# Patient Record
Sex: FEMALE | URBAN - METROPOLITAN AREA
[De-identification: names, ages, dates, MRNs, and addresses within clinical notes are randomized per-mention and may not be internally consistent; named-entity substitution may affect disease eponyms.]

---

## 2018-01-23 ENCOUNTER — IMPORTED ENCOUNTER (OUTPATIENT)
Dept: URBAN - METROPOLITAN AREA CLINIC 9 | Facility: CLINIC | Age: 73
End: 2018-01-23

## 2019-01-11 ENCOUNTER — IMPORTED ENCOUNTER (OUTPATIENT)
Dept: URBAN - METROPOLITAN AREA CLINIC 9 | Facility: CLINIC | Age: 74
End: 2019-01-11

## 2019-01-18 ENCOUNTER — IMPORTED ENCOUNTER (OUTPATIENT)
Dept: URBAN - METROPOLITAN AREA CLINIC 9 | Facility: CLINIC | Age: 74
End: 2019-01-18

## 2019-02-04 ENCOUNTER — IMPORTED ENCOUNTER (OUTPATIENT)
Dept: URBAN - METROPOLITAN AREA CLINIC 9 | Facility: CLINIC | Age: 74
End: 2019-02-04

## 2020-03-06 ENCOUNTER — IMPORTED ENCOUNTER (OUTPATIENT)
Dept: URBAN - METROPOLITAN AREA CLINIC 9 | Facility: CLINIC | Age: 75
End: 2020-03-06

## 2020-05-22 ENCOUNTER — IMPORTED ENCOUNTER (OUTPATIENT)
Dept: URBAN - METROPOLITAN AREA CLINIC 9 | Facility: CLINIC | Age: 75
End: 2020-05-22

## 2020-06-02 NOTE — PATIENT DISCUSSION
Pt does a LOT of work on her phone and wants the best close up vision.  Recommend Mian LARES OU, pt does very little intermediate work.  Most important to pt is distance and reading.
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
## 2020-06-29 NOTE — PATIENT DISCUSSION
Cataract surgery has been performed in the first eye and activities of daily living are still impaired. The patient would like to proceed with cataract surgery in the second eye as scheduled. The patient elects TMF +325 OS, goal of Lilly.

## 2020-07-14 NOTE — PATIENT DISCUSSION
Pt is likely having slight discomfort OS due to ANN; pt will use warm compress and AT QID .  Consider Ilevro QD OS if needed if pt needs it.

## 2021-10-18 ASSESSMENT — KERATOMETRY
OS_AXISANGLE_DEGREES: 66
OS_K2POWER_DIOPTERS: 47.25
OD_K1POWER_DIOPTERS: 47
OD_K2POWER_DIOPTERS: 48
OS_K1POWER_DIOPTERS: 46.5
OD_AXISANGLE2_DEGREES: 19
OS_AXISANGLE2_DEGREES: 156
OD_AXISANGLE_DEGREES: 109

## 2021-10-18 ASSESSMENT — TONOMETRY
OS_IOP_MMHG: 14
OS_IOP_MMHG: 17
OD_IOP_MMHG: 14
OS_IOP_MMHG: 12
OD_IOP_MMHG: 17
OS_IOP_MMHG: 14
OD_IOP_MMHG: 15
OD_IOP_MMHG: 14
OD_IOP_MMHG: 18
OD_IOP_MMHG: 14
OS_IOP_MMHG: 15
OS_IOP_MMHG: 13

## 2021-10-18 ASSESSMENT — VISUAL ACUITY
OD_SC: 20/25 SN
OD_SC: 20/25 SN
OD_CC: 20/20 -2 SN
OS_CC: 20/20 - SN
OS_SC: 20/40 SN
OD_SC: 20/25 SN
OS_SC: 20/30 SN
OS_SC: 20/30 - SN
OD_SC: 20/25 -2 SN
OD_CC: 20/30 SN
OS_SC: 20/25 SN
OS_SC: 20/30 SN
OS_CC: 20/25 SN
OD_CC: 20/25 SN
OS_CC: 20/25 SN
OD_SC: 20/30 SN
OD_SC: 20/30 - SN
OS_SC: 20/25 - SN

## 2022-06-20 RX ORDER — ESTRADIOL 0.5 MG/1
TABLET ORAL
COMMUNITY

## 2022-06-20 RX ORDER — OLMESARTAN MEDOXOMIL 5 MG/1
TABLET ORAL
COMMUNITY

## 2022-06-20 RX ORDER — ALBUTEROL SULFATE 90 UG/1
AEROSOL, METERED RESPIRATORY (INHALATION)
COMMUNITY

## 2022-06-20 RX ORDER — ALENDRONATE SODIUM 70 MG/1
TABLET ORAL
COMMUNITY
Start: 2021-11-17 | End: 2022-07-21

## 2022-06-20 RX ORDER — MINOXIDIL 2 %
SOLUTION, NON-ORAL TOPICAL
COMMUNITY

## 2022-06-20 RX ORDER — BUTALBITAL/ACETAMINOPHEN 50MG-325MG
TABLET ORAL
COMMUNITY
End: 2022-08-19

## 2022-06-20 RX ORDER — MEDROXYPROGESTERONE ACETATE 10 MG/1
TABLET ORAL
COMMUNITY

## 2022-06-20 RX ORDER — RISEDRONATE SODIUM 150 MG/1
TABLET, FILM COATED ORAL
COMMUNITY
Start: 2021-11-17 | End: 2022-07-21

## 2022-06-20 RX ORDER — FINASTERIDE 1 MG/1
TABLET, FILM COATED ORAL
COMMUNITY

## 2022-06-20 RX ORDER — ATORVASTATIN CALCIUM 10 MG/1
TABLET, FILM COATED ORAL
COMMUNITY

## 2022-06-20 RX ORDER — FLUTICASONE FUROATE, UMECLIDINIUM BROMIDE AND VILANTEROL TRIFENATATE 100; 62.5; 25 UG/1; UG/1; UG/1
1 POWDER RESPIRATORY (INHALATION)
COMMUNITY

## 2022-07-21 PROBLEM — K59.01 SLOW TRANSIT CONSTIPATION: Status: ACTIVE | Noted: 2022-07-21

## 2022-07-21 PROBLEM — H91.90 HEARING LOSS: Status: ACTIVE | Noted: 2022-07-21

## 2022-07-21 PROBLEM — E78.5 HYPERLIPIDEMIA: Status: ACTIVE | Noted: 2022-07-21

## 2022-07-21 PROBLEM — M81.0 AGE-RELATED OSTEOPOROSIS WITHOUT CURRENT PATHOLOGICAL FRACTURE: Status: ACTIVE | Noted: 2022-07-21

## 2022-07-21 PROBLEM — J44.9 COPD (CHRONIC OBSTRUCTIVE PULMONARY DISEASE) WITH CHRONIC BRONCHITIS (HCC): Status: ACTIVE | Noted: 2022-07-21

## 2022-07-21 PROBLEM — E04.1 THYROID NODULE: Status: ACTIVE | Noted: 2022-07-21

## 2022-07-21 PROBLEM — M79.672 LEFT FOOT PAIN: Status: ACTIVE | Noted: 2022-07-21

## 2022-07-21 PROBLEM — J30.9 ALLERGIC RHINITIS: Status: ACTIVE | Noted: 2022-07-21

## 2022-07-21 PROBLEM — G43.909 MIGRAINE: Status: ACTIVE | Noted: 2022-07-21

## 2022-07-21 PROBLEM — N95.1 SYMPTOMATIC MENOPAUSAL OR FEMALE CLIMACTERIC STATES: Status: ACTIVE | Noted: 2022-07-21

## 2022-07-21 PROBLEM — I10 HTN (HYPERTENSION): Status: ACTIVE | Noted: 2022-07-21

## 2022-08-19 PROBLEM — J30.9 ALLERGIC RHINITIS: Status: RESOLVED | Noted: 2022-07-21 | Resolved: 2022-08-19

## 2022-08-19 PROBLEM — M79.672 LEFT FOOT PAIN: Status: RESOLVED | Noted: 2022-07-21 | Resolved: 2022-08-19

## 2024-01-23 ENCOUNTER — EMERGENCY VISIT (OUTPATIENT)
Dept: URBAN - METROPOLITAN AREA CLINIC 14 | Facility: CLINIC | Age: 79
End: 2024-01-23

## 2024-01-23 DIAGNOSIS — Z96.1: ICD-10-CM

## 2024-01-23 DIAGNOSIS — H04.123: ICD-10-CM

## 2024-01-23 DIAGNOSIS — H11.151: ICD-10-CM

## 2024-01-23 PROCEDURE — 92002 INTRM OPH EXAM NEW PATIENT: CPT

## 2024-01-23 ASSESSMENT — TONOMETRY
OS_IOP_MMHG: 12
OD_IOP_MMHG: 8

## 2024-01-23 ASSESSMENT — VISUAL ACUITY
OS_SC: 20/25-1
OD_SC: 20/20

## 2024-03-26 ENCOUNTER — ESTABLISHED PATIENT (OUTPATIENT)
Dept: URBAN - METROPOLITAN AREA CLINIC 14 | Facility: CLINIC | Age: 79
End: 2024-03-26

## 2024-03-26 DIAGNOSIS — H43.813: ICD-10-CM

## 2024-03-26 DIAGNOSIS — H11.151: ICD-10-CM

## 2024-03-26 DIAGNOSIS — H35.413: ICD-10-CM

## 2024-03-26 DIAGNOSIS — H04.123: ICD-10-CM

## 2024-03-26 DIAGNOSIS — Z96.1: ICD-10-CM

## 2024-03-26 PROCEDURE — 92014 COMPRE OPH EXAM EST PT 1/>: CPT

## 2024-03-26 ASSESSMENT — VISUAL ACUITY
OD_SC: 20/25-1
OS_SC: 20/25-1
OU_SC: 20/25

## 2024-03-26 ASSESSMENT — TONOMETRY
OS_IOP_MMHG: 11
OD_IOP_MMHG: 15

## 2025-04-23 ENCOUNTER — COMPREHENSIVE EXAM (OUTPATIENT)
Age: 80
End: 2025-04-23

## 2025-04-23 DIAGNOSIS — S05.02XD: ICD-10-CM

## 2025-04-23 PROCEDURE — 99213 OFFICE O/P EST LOW 20 MIN: CPT
